# Patient Record
Sex: FEMALE | Race: WHITE
[De-identification: names, ages, dates, MRNs, and addresses within clinical notes are randomized per-mention and may not be internally consistent; named-entity substitution may affect disease eponyms.]

---

## 2018-09-28 ENCOUNTER — HOSPITAL ENCOUNTER (EMERGENCY)
Dept: HOSPITAL 91 - FTE | Age: 23
Discharge: HOME | End: 2018-09-28
Payer: COMMERCIAL

## 2018-09-28 ENCOUNTER — HOSPITAL ENCOUNTER (EMERGENCY)
Age: 23
Discharge: HOME | End: 2018-09-28

## 2018-09-28 DIAGNOSIS — R10.13: Primary | ICD-10-CM

## 2018-09-28 DIAGNOSIS — R10.2: ICD-10-CM

## 2018-09-28 LAB
URINE BLOOD (DIP) POC: (no result)
URINE PH (DIP) POC: 6 (ref 5–8.5)

## 2018-09-28 PROCEDURE — 99282 EMERGENCY DEPT VISIT SF MDM: CPT

## 2018-09-28 PROCEDURE — 81003 URINALYSIS AUTO W/O SCOPE: CPT

## 2018-09-28 PROCEDURE — 81025 URINE PREGNANCY TEST: CPT

## 2018-09-28 RX ADMIN — ALUMINUM HYDROXIDE, MAGNESIUM HYDROXIDE, DIMETHICONE 1 ML: 200; 200; 20 SUSPENSION ORAL at 21:06

## 2018-09-28 RX ADMIN — RANITIDINE 1 MG: 150 TABLET ORAL at 21:06

## 2018-12-23 ENCOUNTER — HOSPITAL ENCOUNTER (EMERGENCY)
Dept: HOSPITAL 91 - FTE | Age: 23
Discharge: HOME | End: 2018-12-23
Payer: COMMERCIAL

## 2018-12-23 ENCOUNTER — HOSPITAL ENCOUNTER (EMERGENCY)
Dept: HOSPITAL 10 - FTE | Age: 23
Discharge: HOME | End: 2018-12-23
Payer: COMMERCIAL

## 2018-12-23 VITALS
BODY MASS INDEX: 21.68 KG/M2 | HEIGHT: 60 IN | HEIGHT: 60 IN | BODY MASS INDEX: 21.68 KG/M2 | WEIGHT: 110.45 LBS | WEIGHT: 110.45 LBS

## 2018-12-23 VITALS — RESPIRATION RATE: 16 BRPM | HEART RATE: 100 BPM | SYSTOLIC BLOOD PRESSURE: 114 MMHG | DIASTOLIC BLOOD PRESSURE: 58 MMHG

## 2018-12-23 DIAGNOSIS — R09.81: ICD-10-CM

## 2018-12-23 DIAGNOSIS — R05: Primary | ICD-10-CM

## 2018-12-23 PROCEDURE — 99283 EMERGENCY DEPT VISIT LOW MDM: CPT

## 2018-12-23 NOTE — ERD
ER Documentation


Chief Complaint


Chief Complaint





COUGH, CONGESTION, RUNNY NOSE





HPI


This patient is a 23-year-old female with no significant medical history 


presenting to the emergency department complaining of intermittent, moderate, 


cough and nasal congestion which began today.  She took over-the-counter meds 


unknown name with some relief.  She denies fevers or sick contacts or other 


symptoms at this time.





ROS


All systems reviewed and are negative except as per history of present illness.





Medications


Home Meds


Active Scripts


Benzonatate* (Benzonatate*) 200 Mg Capsule, 200 MG PO TID PRN for COUGH, #15 CAP


   Prov:LIAN COLLADO PA-C         12/23/18


Cetirizine Hcl* (Zyrtec*) 10 Mg Capsule, 10 MG PO DAILY, #10 TAB.CHEW


   Prov:LIAN COLLADO PA-C         12/23/18


Phenol* (Chloraseptic* Spray) 177 Ml [place].pump, 2 SPRAY MT Q2H PRN for SORE 


THROAT, #1 BOTTLE


   Prov:LIAN COLLADO PA-C         12/23/18


Fluticasone Propionate (Flonase Allergy Relief) 9.9 Ml [place].susp, 1 SPRAY NASAL


BID, #1 BOTTLE


   TO EACH NOSTRIL


   Prov:LIAN COLLADO PA-C         12/23/18


Ibuprofen* (Motrin*) 400 Mg Tab, 400 MG PO Q6, #30 TAB


   Prov:LIAN COLLADO PA-C         12/23/18


Ranitidine Hcl* (Zantac*) 150 Mg Tablet, 150 MG PO BID PRN for EPIGASTRIC PAIN, 


#30 TAB


   Prov:LIAN COLLADO PA-C         9/28/18


Omeprazole* (Omeprazole*) 20 Mg Capsule.dr, 20 MG PO DAILY, #20


   Prov:LIAN COLLADO PA-C         9/28/18





Allergies


Allergies:  


Coded Allergies:  


     No Known Allergies (Verified  Allergy, Mild, 12/17/14)





PMhx/Soc


Medical and Surgical Hx:  pt denies Medical Hx, pt denies Surgical Hx


History of Surgery:  No


Anesthesia Reaction:  No


Hx Neurological Disorder:  No


Hx Respiratory Disorders:  No


Hx Cardiac Disorders:  No


Hx Psychiatric Problems:  No


Hx Miscellaneous Medical Probl:  No


Hx Alcohol Use:  No


Hx Substance Use:  No


Hx Tobacco Use:  No


Smoking Status:  Never smoker





FmHx


Family History:  No diabetes





Physical Exam


Vitals





Vital Signs


  Date      Temp  Pulse  Resp  B/P (MAP)   Pulse Ox  O2          O2 Flow    FiO2


Time                                                 Delivery    Rate


  12/23/18  98.7    100    16      114/58        96


     01:29                           (76)





Physical Exam


Const:   No acute distress


Head:   Atraumatic 


Eyes:    Normal Conjunctiva


ENT:    Normal External Ears, Nose and Mouth.  Nares are congested.  Posterior 


pharynx is clear.  Airway is patent.  There is no tonsillar enlargement.  There 


is no exudate.  Uvula is midline.


Neck:               Full range of motion. No meningismus.


Resp:   Clear to auscultation bilaterally


Cardio:   Regular rate and rhythm, no murmurs


Skin:   No petechiae or rashes


Ext:    No cyanosis, or edema


Neur:   Awake and alert


Psych:    Normal Mood and Affect





Procedures/MDM


23-year-old female presenting to the emergency department complaining of cough 


and nasal congestion.  She is nontoxic and well-appearing and afebrile.





The patient's clinical presentation is very consistent with an acute viral 


syndrome.





The patient does not exhibit any clinical signs or symptoms concerning for 


serious bacterial infection or systemic illness. Based on history and clinical 


exam findings the patient does not appear to have evidence of pneumonia, strep 


pharyngitis, urinary tract infection, bacteremia, sepsis, or meningitis.





For these reasons I do not believe it is necessary to obtain laboratory testing 


or diagnostic imaging. I believe it would be appropriate for symptom control, 


and close outpatient primary care follow-up.





Based on patient's history of present illness and physical examination the 


decision was made to discharge. There is no evidence of life threatening 


injuries or illnesses at this time.





On re-examination, patient resting in no distress, stable vital signs, reports 


feeling better and safe for discharge with outpatient follow up with PMD in 1-2 


days. Patient given return precautions.





Departure


Diagnosis:  


   Primary Impression:  


   Flu-like symptoms


Condition:  Fair


Patient Instructions:  Adult Self-Care for Colds, Influenza (Adult)





Additional Instructions:  


Llame al doctor MAANA y lamont melody MARK PARA DENTRO DE 1-2 COLLINS.Dgale a la 


secretaria que nosotros le instruimos hacer esta mark.Avise o llame si bethea 


condicin se empeora antes de la mark. Regresa aqui si peor o no mejor.











LIAN COLLADO PA-C       Dec 23, 2018 04:08